# Patient Record
Sex: MALE | Race: WHITE | Employment: STUDENT | URBAN - METROPOLITAN AREA
[De-identification: names, ages, dates, MRNs, and addresses within clinical notes are randomized per-mention and may not be internally consistent; named-entity substitution may affect disease eponyms.]

---
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
## 2025-03-18 ENCOUNTER — OFFICE VISIT (OUTPATIENT)
Dept: INTERNAL MEDICINE CLINIC | Facility: CLINIC | Age: 20
End: 2025-03-18
Payer: COMMERCIAL

## 2025-03-18 VITALS
DIASTOLIC BLOOD PRESSURE: 70 MMHG | TEMPERATURE: 98 F | HEART RATE: 84 BPM | BODY MASS INDEX: 22.62 KG/M2 | OXYGEN SATURATION: 99 % | WEIGHT: 158 LBS | HEIGHT: 70 IN | SYSTOLIC BLOOD PRESSURE: 124 MMHG

## 2025-03-18 DIAGNOSIS — R53.83 OTHER FATIGUE: ICD-10-CM

## 2025-03-18 DIAGNOSIS — Z00.00 HEALTH MAINTENANCE EXAMINATION: Primary | ICD-10-CM

## 2025-03-18 DIAGNOSIS — Z91.09 ENVIRONMENTAL ALLERGIES: ICD-10-CM

## 2025-03-18 DIAGNOSIS — R00.2 PALPITATIONS: ICD-10-CM

## 2025-03-18 LAB
ATRIAL RATE: 65 BPM
P AXIS: 66 DEGREES
P-R INTERVAL: 154 MS
Q-T INTERVAL: 410 MS
QRS DURATION: 104 MS
QTC CALCULATION (BEZET): 426 MS
R AXIS: 56 DEGREES
T AXIS: 36 DEGREES
VENTRICULAR RATE: 65 BPM

## 2025-03-18 NOTE — PROGRESS NOTES
FAMILY MEDICINE CLINIC NOTE    HPI  Abner Cherry is a 19 year old male presenting for physical    #Health Maintenance  -Diet: Eating healthy - trying to stay away from junk food.   -Exercise: Not much.   -Lung cancer screen: Not indicated  -Colon cancer screen: Not indicated  -Prostate cancer screen: Not indicated  -Aortic aneurysm screen: Not indicated  -Statin:  Will check lipid panel  -ASA: Not indicated  -HIV screen: Indicated  -Hep C screen: Indicated  -Gonorrhea/chlamydia:  Not indicated  -Syphillis: Not indicated  -TB: Not indicated  -Tobacco/alcohol: Per below  -Depression: PHQ-2 score of 0 (score >/= 3 do PHQ-9)  -Advanced Directive: Indicated    #Immunizations  -Tdap: Indicated  -Flu shot: Indicated - declines  -PCV13: Not indicated   -PCV20: Not indicated   -PPSV23: Not indicated   -HPV:  Will check records  -RZV (preferred) or VZL: Not indicated   -RSV: Not indicated   -COVID: Not indicated      #Environmental allergies  -occasional allergies  -history of normal PFTs in past  -no issues with breathing at this time    #Fatigue  -reports history of fatigue 2-3 months, possibly longer  -wakes up tired  -no known history of snoring  -no depression  -no issues with sleeping    #Palpitations  -only with smoking marijuana  -denies any significant anxiety  -occurs for a couple of minutes  -experiences this every other day  -feels lightheaded during these times     #Patient Care Team  Patient Care Team:  Makayla Jain as PCP - General (Family Medicine)    ROS  GENERAL: No fever/chills, no recent weight loss   HEENT: No visual changes, no changes in hearing, no sore throats  NECK: No pain, no swelling  RESP: No cough, no SOB  CV: No chest pain, no palpitations  GI: No abd pain, no N/V/D  MSK: No edema, no pain  SKIN: No new rashes  NEURO: No numbness, no tingling, no HA    HEALTH MAINTENANCE CHECKLIST  Health Maintenance Topics with due status: Overdue       Topic Date Due    Annual Physical Never done     MMR Vaccines Never done    Varicella Vaccines Never done    HPV Vaccines Never done    Meningococcal B Vaccine Never done    DTaP,Tdap,and Td Vaccines Never done    Hepatitis B Vaccines Never done    COVID-19 Vaccine Never done    Influenza Vaccine Never done    Annual Depression Screening Never done       ALLERGIES  Allergies[1]    MEDICATIONS  No current outpatient medications on file.       ACTIVE PROBLEM  Patient Active Problem List   Diagnosis    Environmental allergies    Fatigue    Palpitations    Health maintenance examination         PAST MEDICAL HISTORY  Past Medical History:    Environmental allergies       PAST SOCIAL HISTORY  Social History     Socioeconomic History    Marital status: Single     Spouse name: Not on file    Number of children: Not on file    Years of education: Not on file    Highest education level: Not on file   Occupational History    Not on file   Tobacco Use    Smoking status: Former     Current packs/day: 0.00     Average packs/day: 0.1 packs/day for 1 year (0.1 ttl pk-yrs)     Types: Cigarettes     Start date:      Quit date:      Years since quittin.2    Smokeless tobacco: Current    Tobacco comments:     Nicotine pouches   Vaping Use    Vaping status: Former    Quit date: 2023    Substances: Nicotine, THC, Flavoring   Substance and Sexual Activity    Alcohol use: Yes     Comment: Occasional - once a month    Drug use: Yes     Types: Cannabis    Sexual activity: Not Currently     Partners: Female   Other Topics Concern    Not on file   Social History Narrative    Relationships: Single    Children: None    Pets: Dog and Cat and Fish    School: [employer] - associates in general studies. 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     Interests: Enjoys music, DJing    Spiritual: Adventist background     Social Drivers of Health     Food Insecurity: Not on file   Transportation Needs: Not on file   Stress: Not  on file   Housing Stability: At Risk (8/21/2023)    Received from [facility]     Living Situation: Not on file     Housing Problems: Not on file       PAST SURGICAL HISTORY  History reviewed. No pertinent surgical history.    PAST FAMILY HISTORY  Family History   Problem Relation Age of Onset    Other (Ankylosing spondylitis) Mother     Obesity Father     No Known Problems Brother     Heart Disease Maternal Grandmother         heart disease vs lung disease    Arthritis Maternal Grandfather     No Known Problems Paternal Grandmother     Lung Disorder Paternal Grandfather     Diabetes Maternal Great-Grandmother     Colon Cancer Neg     Prostate Cancer Neg        PHYSICAL EXAM  Vitals:    03/18/25 0946   BP: 124/70   Pulse: 84   Temp: 98.4 °F (36.9 °C)   SpO2: 99%   Weight: 158 lb (71.7 kg)   Height: 5' 10\" (1.778 m)      Body mass index is 22.67 kg/m².    GENERAL: NAD  HEENT: Moist mucous membranes, no tonsillar swelling or erythema, PERRLA bilat, TM translucent and non-bulging  NECK: Supple, non-tender  RESP: CTAB, no wheezing, no rales, no rhonchi  CV: RRR, no murmurs  GI: Soft, non-distended, non-tender, no guarding, no rebound, no masses  MSK: No edema  SKIN: Warm and dry, no rashes  NEURO: Answering questions appropriately    LABS  No results found for: \"WBC\", \"HGB\", \"HCT\", \"PLT\", \"NEPERCENT\", \"LYPERCENT\", \"MOPERCENT\", \"EOPERCENT\", \"BAPERCENT\", \"NE\", \"LYMABS\", \"MOABSO\", \"EOABSO\", \"BAABSO\", \"REITCPERCENT\"    No results found for: \"NA\", \"K\", \"CL\", \"CO2\", \"ANIONGAP\", \"BUN\", \"CREATSERUM\", \"BUNCREA\", \"GLU\", \"CA\", \"OSMOCALC\", \"GFRNAA\", \"GFRAA\", \"ALT\", \"AST\", \"ALKPHO\", \"BILT\", \"TP\", \"ALB\", \"GLOBULIN\", \"ELECTAG\", \"FASTING\"      No results found for: \"CHOLEST\", \"TRIG\", \"HDL\", \"LDL\", \"VLDL\", \"TCHDLRATIO\", \"NONHDLC\", \"CHOLHDLRATIO\", \"CALCNONHDL\"     DIAGNOSTICS    ASSESSMENT/PLAN  Problem List Items Addressed This Visit          Cardiac and Vasculature    Palpitations     Patient with infrequent  intermittent palpitations, exclusively occurring when he is smoking marijuana.  Advise discontinuation of smoking.  EKG is normal in the office  Check labs including CBC, CMP, TSH.  If with persistent palpitations, advised that he contact me and I will order a Holter monitor for him moving forward.  ER precautions discussed.  Follow-up as needed         Relevant Orders    TSH W Reflex To Free T4    EKG In-Office [[id number]] (Completed)    CBC With Differential With Platelet       Allergies and Adverse Reactions    Environmental allergies     Patient with environmental allergies.  No issues with his breathing.  Has had normal pulmonary function testing in the past.  Can use cetirizine as needed.            Symptoms and Signs    Fatigue     Patient reports mild fatigue.  Check labs including CBC, CMP, TSH.  Monitor stress levels.  Cut back on nicotine usage as well as marijuana usage.  Follow-up as needed.         Relevant Orders    TSH W Reflex To Free T4    CBC With Differential With Platelet    Comp Metabolic Panel (14)    Hemoglobin A1C       Health Encounters    Health maintenance examination - Primary     Exercise and diet advised.  CBC, CMP, lipid panel, Hba1c, TSH, HIV screen, hepatitis C screen  Tdap today.  Flu shot declined  Advised to send over pediatric vaccine records, especially HPV  COVID vaccine advised.  Advanced directive information provided.         Relevant Orders    TETANUS, DIPHTHERIA TOXOIDS AND ACELLULAR PERTUSIS VACCINE (TDAP), >7 YEARS, IM USE (Completed)    TSH W Reflex To Free T4    CBC With Differential With Platelet    Comp Metabolic Panel (14)    HCV Antibody    Hemoglobin A1C    Lipid Panel    HIV Ag/Ab Combo    Tobacco Use Counseling 3-10 Min [14428]   Tobacco cessation counseling for <3 minutes.    Return in about 1 year (around 3/18/2026) for physical.    Julio Hairston MD  Family Medicine         [1]   Allergies  Allergen Reactions    Omnicef [Cefdinir] RASH

## 2025-03-18 NOTE — ASSESSMENT & PLAN NOTE
Patient with infrequent intermittent palpitations, exclusively occurring when he is smoking marijuana.  Advise discontinuation of smoking.  EKG is normal in the office  Check labs including CBC, CMP, TSH.  If with persistent palpitations, advised that he contact me and I will order a Holter monitor for him moving forward.  ER precautions discussed.  Follow-up as needed

## 2025-03-18 NOTE — ASSESSMENT & PLAN NOTE
Patient with environmental allergies.  No issues with his breathing.  Has had normal pulmonary function testing in the past.  Can use cetirizine as needed.

## 2025-03-18 NOTE — PATIENT INSTRUCTIONS
PATIENT INSTRUCTIONS    Thank you for seeing me today, it was a pleasure taking care of you.  Please check out at the  and schedule a follow up appointment.  Return in about 1 year (around 3/18/2026) for physical.  Please remember that the preferred deborah period for appointments is 5 minutes. This is to help maximize the amount of time that we can spend together at our visits.    Please get your labs drawn at your preferred lab.  The following imaging studies were ordered: None  Please also follow up with the following specialists: None  Please fill out the advance directive information (power of  documents) and bring a copy to our clinic.  Healthy diet and exercise  Cut back on nicotine, alcohol, marijuana  If you continue to have palpitations, please call me and I will order a holter monitor for you to get   Monitor stress and anxiety   Please send me your pediatric vaccine history  In particular - would like to know about HPV vaccine series    Best,   Dr. Hairston    [facility] LABS AND IMAGING INFORMATION    Here are some lab and imaging locations available to you. Please note that some of the times and availabilities are subject to change. Please refer to the [facility] webpage for the most recent updates. There are also additional locations that can be found on the [facility] webpage.    To schedule a lab appointment, please call [phone].    To schedule an imaging appointment, please call [phone], option 2      [address] [address]    Lab Hours  Monday - Friday 7:30am - 5pm  Saturday 6:30am - 3pm    X-ray Hours  Call [phone] for hours or to schedule      [address] [address]    Lab Hours  Monday - Friday 6:30am - 8pm  Saturday 6:30am - 3pm  Sunday 6:30am - 3pm    X-ray Hours  Call [phone] for hours or to schedule      [facility]  [facility]  [address] [place] [address]    Lab Hours  Monday - Friday 7:30am - 5pm  Saturday 7:30am - 11:30am    X-ray Hours  None at this location      [facility] [place]  [address] [place] [address]    Lab Hours  Monday - Friday 8am - 12pm    X-ray Hours  Call [phone] for hours or to schedule      [facility] [place]  [address] [place] [address]    Lab Hours  Monday - Friday 7:30am - 5pm  Saturday 6:30am - 3pm    X-ray Hours  Call [phone] for hours or to schedule      [facility] [place]  [address]  [place] [address]    Lab Hours  Monday - Friday 7:30am - 4pm (closed 12:15pm - 1pm)    X-ray Hours  Call [phone] for hours or to schedule      [facility] [place]  [address]  [address]  [place] [address]    Lab Hours  Monday - Friday 8am - 4:30pm (closed 12:15pm - 1pm)    X-ray Hours  None at this location        Quitting Smoking    Quitting smoking is the most important step you can take to improve your health. We're glad you have set a goal to improve your health.    Quit Smoking Resources    In addition to medications, use the STAR plan to help you successfully quit.   Stick with your quit date!   Tell friends, family, and coworkers your quit date. Request their understanding and support.  Anticipate and prepare for challenges. Some examples are withdrawal symptoms, being around others who smoke, and drinking alcohol.  Remove all tobacco products and paraphernalia from your environment. Make your home and vehicles smoke-free.    Free resources for additional support:  National tobacco quitline: 1-800-QUIT-NOW (1-265.676.7973).  SmokefreeTXT is a free text program to assist you in quitting. Visit https://www.smokefree.gov/smokefreetxt for more information.  Feel free to  call your care manager at [phone]) for additional support.

## 2025-03-18 NOTE — ASSESSMENT & PLAN NOTE
Exercise and diet advised.  CBC, CMP, lipid panel, Hba1c, TSH, HIV screen, hepatitis C screen  Tdap today.  Flu shot declined  Advised to send over pediatric vaccine records, especially HPV  COVID vaccine advised.  Advanced directive information provided.

## 2025-03-18 NOTE — ASSESSMENT & PLAN NOTE
Patient reports mild fatigue.  Check labs including CBC, CMP, TSH.  Monitor stress levels.  Cut back on nicotine usage as well as marijuana usage.  Follow-up as needed.

## 2025-03-20 ENCOUNTER — LAB ENCOUNTER (OUTPATIENT)
Dept: LAB | Age: 20
End: 2025-03-20
Attending: FAMILY MEDICINE
Payer: COMMERCIAL

## 2025-03-20 DIAGNOSIS — R53.83 OTHER FATIGUE: ICD-10-CM

## 2025-03-20 DIAGNOSIS — Z00.00 HEALTH MAINTENANCE EXAMINATION: ICD-10-CM

## 2025-03-20 DIAGNOSIS — R00.2 PALPITATIONS: ICD-10-CM

## 2025-03-20 LAB
ALBUMIN SERPL-MCNC: 5 G/DL (ref 3.2–4.8)
ALBUMIN/GLOB SERPL: 2.2 {RATIO} (ref 1–2)
ALP LIVER SERPL-CCNC: 75 U/L
ALT SERPL-CCNC: 12 U/L
ANION GAP SERPL CALC-SCNC: 5 MMOL/L (ref 0–18)
AST SERPL-CCNC: 17 U/L (ref ?–34)
BASOPHILS # BLD AUTO: 0.03 X10(3) UL (ref 0–0.2)
BASOPHILS NFR BLD AUTO: 0.9 %
BILIRUB SERPL-MCNC: 0.8 MG/DL (ref 0.3–1.2)
BUN BLD-MCNC: 15 MG/DL (ref 9–23)
BUN/CREAT SERPL: 14.4 (ref 10–20)
CALCIUM BLD-MCNC: 9.5 MG/DL (ref 8.7–10.4)
CHLORIDE SERPL-SCNC: 106 MMOL/L (ref 98–112)
CHOLEST SERPL-MCNC: 166 MG/DL (ref ?–200)
CO2 SERPL-SCNC: 29 MMOL/L (ref 21–32)
CREAT BLD-MCNC: 1.04 MG/DL
DEPRECATED RDW RBC AUTO: 40.6 FL (ref 35.1–46.3)
EGFRCR SERPLBLD CKD-EPI 2021: 105 ML/MIN/1.73M2 (ref 60–?)
EOSINOPHIL # BLD AUTO: 0.19 X10(3) UL (ref 0–0.7)
EOSINOPHIL NFR BLD AUTO: 5.7 %
ERYTHROCYTE [DISTWIDTH] IN BLOOD BY AUTOMATED COUNT: 12 % (ref 11–15)
EST. AVERAGE GLUCOSE BLD GHB EST-MCNC: 100 MG/DL (ref 68–126)
FASTING PATIENT LIPID ANSWER: YES
FASTING STATUS PATIENT QL REPORTED: YES
GLOBULIN PLAS-MCNC: 2.3 G/DL (ref 2–3.5)
GLUCOSE BLD-MCNC: 87 MG/DL (ref 70–99)
HBA1C MFR BLD: 5.1 % (ref ?–5.7)
HCT VFR BLD AUTO: 42.9 %
HCV AB SERPL QL IA: NONREACTIVE
HDLC SERPL-MCNC: 58 MG/DL (ref 40–59)
HGB BLD-MCNC: 14.5 G/DL
IMM GRANULOCYTES # BLD AUTO: 0 X10(3) UL (ref 0–1)
IMM GRANULOCYTES NFR BLD: 0 %
LDLC SERPL CALC-MCNC: 100 MG/DL (ref ?–100)
LYMPHOCYTES # BLD AUTO: 1.24 X10(3) UL (ref 1–4)
LYMPHOCYTES NFR BLD AUTO: 37 %
MCH RBC QN AUTO: 30.9 PG (ref 26–34)
MCHC RBC AUTO-ENTMCNC: 33.8 G/DL (ref 31–37)
MCV RBC AUTO: 91.3 FL
MONOCYTES # BLD AUTO: 0.38 X10(3) UL (ref 0.1–1)
MONOCYTES NFR BLD AUTO: 11.3 %
NEUTROPHILS # BLD AUTO: 1.51 X10 (3) UL (ref 1.5–7.7)
NEUTROPHILS # BLD AUTO: 1.51 X10(3) UL (ref 1.5–7.7)
NEUTROPHILS NFR BLD AUTO: 45.1 %
NONHDLC SERPL-MCNC: 108 MG/DL (ref ?–130)
OSMOLALITY SERPL CALC.SUM OF ELEC: 290 MOSM/KG (ref 275–295)
PLATELET # BLD AUTO: 211 10(3)UL (ref 150–450)
POTASSIUM SERPL-SCNC: 4.7 MMOL/L (ref 3.5–5.1)
PROT SERPL-MCNC: 7.3 G/DL (ref 5.7–8.2)
RBC # BLD AUTO: 4.7 X10(6)UL
SODIUM SERPL-SCNC: 140 MMOL/L (ref 136–145)
TRIGL SERPL-MCNC: 35 MG/DL (ref 30–149)
TSI SER-ACNC: 1.94 UIU/ML (ref 0.55–4.78)
VLDLC SERPL CALC-MCNC: 6 MG/DL (ref 0–30)
WBC # BLD AUTO: 3.4 X10(3) UL (ref 4–11)

## 2025-03-20 PROCEDURE — 80053 COMPREHEN METABOLIC PANEL: CPT

## 2025-03-20 PROCEDURE — 80061 LIPID PANEL: CPT

## 2025-03-20 PROCEDURE — 84443 ASSAY THYROID STIM HORMONE: CPT

## 2025-03-20 PROCEDURE — 36415 COLL VENOUS BLD VENIPUNCTURE: CPT

## 2025-03-20 PROCEDURE — 85025 COMPLETE CBC W/AUTO DIFF WBC: CPT

## 2025-03-20 PROCEDURE — 86803 HEPATITIS C AB TEST: CPT

## 2025-03-20 PROCEDURE — 87389 HIV-1 AG W/HIV-1&-2 AB AG IA: CPT

## 2025-03-20 PROCEDURE — 83036 HEMOGLOBIN GLYCOSYLATED A1C: CPT

## 2025-04-03 ENCOUNTER — PATIENT MESSAGE (OUTPATIENT)
Dept: INTERNAL MEDICINE CLINIC | Facility: CLINIC | Age: 20
End: 2025-04-03

## 2025-04-03 ENCOUNTER — TELEPHONE (OUTPATIENT)
Dept: INTERNAL MEDICINE CLINIC | Facility: CLINIC | Age: 20
End: 2025-04-03

## 2025-04-03 NOTE — TELEPHONE ENCOUNTER
Patient is requesting a referral to a Dermatologist;  patient is aware that Dr. Hairston will be out of the office until 04/16/2025.    Patient was last seen on 03/18/2025 for a new patient exam.    Please call patient when referral is in the system and available to schedule:    [phone]     KG

## 2025-04-04 ENCOUNTER — NURSE TRIAGE (OUTPATIENT)
Dept: INTERNAL MEDICINE CLINIC | Facility: CLINIC | Age: 20
End: 2025-04-04

## 2025-04-04 NOTE — TELEPHONE ENCOUNTER
Spoke with Miki who states he had a mole on top of his head and seen a Dermatologist 3 years ago. Patient denied change in the size, color, pain, itching or bleeding.    Patient is requesting Dermatology referral.       Disposition: Seen in 2 weeks    RN informed Miki if there are any changes with mole color,shape, size, develop pain, or bleeding call the office. Patient voiced understanding.    Does Miki need to schedule an appointment with Dr. Hairston for evaluation prior to Dermatology referral?    Please advise.      Reason for Disposition  • Caller is uncertain what lesion is    Answer Assessment - Initial Assessment Questions  1. APPEARANCE of LESION: \"What does it look like?\"       Small non-raised mole.   2. SIZE: \"How big is it?\" (e.g., inches, cm; or compare to size of pinhead, tip of pen, eraser, coin, pea, grape, ping pong ball)       Unknown since on top of patient's head.  3. COLOR: \"What color is it?\" \"Is there more than one color?\"      Brown  4. SHAPE: \"What shape is it?\" (e.g., round, irregular)      Round  5. RAISED: \"Does it stick up above the skin or is it flat?\" (e.g., raised or elevated)      Denied  6. TENDER: \"Does it hurt when you touch it?\"  (Scale 1-10; or mild, moderate, severe)      0/10  7. LOCATION: \"Where is it located?\"       Top of head.   8. ONSET: \"When did it first appear?\"       3 years  9. NUMBER: \"Is there just one?\" or \"Are there others?\"      1  10. CAUSE: \"What do you think it is?\"        Patient has mole for last 3 years without changes.   11. OTHER SYMPTOMS: \"Do you have any other symptoms?\" (e.g., fever)        Denied  12. PREGNANCY: \"Is there any chance you are pregnant?\" \"When was your last menstrual period?\"        N/A    Protocols used: Skin Lesion - Moles or Growths-A-OH

## 2025-04-04 NOTE — TELEPHONE ENCOUNTER
I don't know what is Dr. Hairston's preference since  he was just seen recently. Thus I will defer to Dr. Hairston to determine upon his return. Sounds like this is not urgent though as he has had this mole for a long time ant its not changing in any way per your note.

## 2025-04-10 ENCOUNTER — HOSPITAL ENCOUNTER (OUTPATIENT)
Age: 20
Discharge: HOME | End: 2025-04-10
Payer: COMMERCIAL

## 2025-04-10 VITALS
OXYGEN SATURATION: 100 % | TEMPERATURE: 98 F | SYSTOLIC BLOOD PRESSURE: 121 MMHG | DIASTOLIC BLOOD PRESSURE: 69 MMHG | RESPIRATION RATE: 18 BRPM | HEART RATE: 71 BPM

## 2025-04-10 DIAGNOSIS — J10.1 INFLUENZA B: ICD-10-CM

## 2025-04-10 DIAGNOSIS — R05.9 COUGH: Primary | ICD-10-CM

## 2025-04-10 LAB
POCT INFLUENZA A: NEGATIVE
POCT INFLUENZA B: POSITIVE
SARS-COV-2 RNA RESP QL NAA+PROBE: NOT DETECTED

## 2025-04-10 PROCEDURE — U0002 COVID-19 LAB TEST NON-CDC: HCPCS | Performed by: PHYSICIAN ASSISTANT

## 2025-04-10 PROCEDURE — 99203 OFFICE O/P NEW LOW 30 MIN: CPT | Performed by: NURSE PRACTITIONER

## 2025-04-10 PROCEDURE — 87502 INFLUENZA DNA AMP PROBE: CPT | Performed by: PHYSICIAN ASSISTANT

## 2025-04-10 RX ORDER — BENZONATATE 100 MG/1
100 CAPSULE ORAL 3 TIMES DAILY PRN
Qty: 10 CAPSULE | Refills: 0 | Status: SHIPPED | OUTPATIENT
Start: 2025-04-10

## 2025-04-10 RX ORDER — OSELTAMIVIR PHOSPHATE 75 MG/1
75 CAPSULE ORAL 2 TIMES DAILY
Qty: 10 CAPSULE | Refills: 0 | Status: SHIPPED | OUTPATIENT
Start: 2025-04-10 | End: 2025-04-15

## 2025-04-10 NOTE — ED PROVIDER NOTES
Patient Seen in: [facility]      History     Chief Complaint   Patient presents with    Fever     Fever for 3rd day. Feeling weak overall - Entered by patient     Stated Complaint: Fever - Fever for 3rd day. Feeling weak overall  Subjective:   19-year-old male with no past medical history presents from home.  Patient is here with complaint of fever and cough for the last 3 days.  Tmax 103.  Having some stuffy nose and coughing up some phlegm.  Associated body aches.  States he feels fatigued.  No shortness of breath or wheezing.  Taking DayQuil and NyQuil at home for symptoms.  No COVID testing done at home.  No history of pneumonia.  He is a social smoker.    The history is provided by the patient. No  was used.     Objective:   Past Medical History:    Environmental allergies            History reviewed. No pertinent surgical history.           Social History     Socioeconomic History    Marital status: Single   Tobacco Use    Smoking status: Former     Current packs/day: 0.00     Average packs/day: 0.1 packs/day for 1 year (0.1 ttl pk-yrs)     Types: Cigarettes     Start date:      Quit date:      Years since quittin.2    Smokeless tobacco: Current    Tobacco comments:     Nicotine pouches   Vaping Use    Vaping status: Former    Quit date: 2023    Substances: Nicotine, THC, Flavoring   Substance and Sexual Activity    Alcohol use: Yes     Comment: Occasional - once a month    Drug use: Yes     Types: Cannabis    Sexual activity: Not Currently     Partners: Female   Social History Narrative    Relationships: Single    Children: None    Pets: Dog and Cat and Fish    School: [employer] - associates in general studies. 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     Interests: Enjoys music, DJing    Spiritual: Orthodox background     Social Drivers of Health      Received from [employer]    [facility]             Review of Systems    Positive for stated complaint: Fever (Fever for 3rd day. Feeling weak overall - Entered by patient)    Other systems are as noted in HPI.  Constitutional and vital signs reviewed.      All other systems reviewed and negative except as noted above.    Physical Exam     ED Triage Vitals [04/10/25 1025]   /69   Pulse 71   Resp 18   Temp 98.2 °F (36.8 °C)   Temp src Oral   SpO2 100 %   O2 Device None (Room air)     Current:/69   Pulse 71   Temp 98.2 °F (36.8 °C) (Oral)   Resp 18   SpO2 100%     Physical Exam  Vitals and nursing note reviewed.   Constitutional:       General: He is not in acute distress.     Appearance: Normal appearance. He is not ill-appearing or toxic-appearing.   HENT:      Head: Normocephalic and atraumatic.      Right Ear: Tympanic membrane, ear canal and external ear normal.      Left Ear: Tympanic membrane, ear canal and external ear normal.      Nose: Nose normal.      Mouth/Throat:      Mouth: Mucous membranes are moist.      Pharynx: Oropharynx is clear.   Eyes:      Pupils: Pupils are equal, round, and reactive to light.   Cardiovascular:      Rate and Rhythm: Normal rate and regular rhythm.      Pulses: Normal pulses.   Pulmonary:      Effort: Pulmonary effort is normal. No respiratory distress.      Breath sounds: Normal breath sounds.      Comments: Lungs clear.  No adventitious lung sounds.  No distress.  No hypoxia.  Pulse ox 100% ra. Which is normal    Abdominal:      General: Abdomen is flat.      Palpations: Abdomen is soft.   Musculoskeletal:         General: No signs of injury. Normal range of motion.      Cervical back: Normal range of motion and neck supple.   Lymphadenopathy:      Cervical: No cervical adenopathy.   Skin:     General: Skin is warm and dry.      Capillary Refill: Capillary refill takes less than 2 seconds.   Neurological:      General: No focal deficit present.      Mental Status: He is alert and oriented to person, place,  and time.      GCS: GCS eye subscore is 4. GCS verbal subscore is 5. GCS motor subscore is 6.   Psychiatric:         Mood and Affect: Mood normal.         Behavior: Behavior normal.         Thought Content: Thought content normal.         Judgment: Judgment normal.         ED Course   Radiology:  No results found.  Labs Reviewed   POCT FLU TEST - Abnormal; Notable for the following components:       Result Value    POCT INFLUENZA B Positive (*)     All other components within normal limits    Narrative:     This assay is a rapid molecular in vitro test utilizing nucleic acid amplification of influenza A and B viral RNA.   RAPID SARS-COV-2 BY PCR - Normal     Recent Results (from the past 24 hours)   POCT Flu Test    Collection Time: 04/10/25 10:29 AM    Specimen: Nares; Other   Result Value Ref Range    POCT INFLUENZA A Negative Negative    POCT INFLUENZA B Positive (A) Negative   Rapid SARS-CoV-2 by PCR    Collection Time: 04/10/25 10:29 AM    Specimen: Nares; Other   Result Value Ref Range    Rapid SARS-CoV-2 by PCR Not Detected Not Detected       MDM     Medical Decision Making  Differential diagnoses reflecting the complexity of care include: COVID, flu, pneumonia, viral URI  Influenza B is positive.  A is negative  COVID is negative  No evidence of pneumonia  Patient well-appearing on exam.  Afebrile here  Discussed viral origin of the flu.  We did discuss the option of Tamiflu, side effects discussed    Plan of Care: Rx Tamiflu and Tessalon.  May continue NyQuil and DayQuil.  Stressed the need to treat fever with Tylenol or Motrin.  Push fluids.  Expect to be ill for several more days.  Follow-up with primary doctor if no improvement    Results and plan of care discussed with the patient/family. They are in agreement with discharge. They understand to follow up with their primary doctor or the referral physician for further evaluation, especially if no improvement.  Also discussed the limitations of immediate  care, patient is aware that if symptoms are worse they should go to the emergency room. Verbal and written discharge instructions were given.     My independent interpretation of studies of: Influenza B positive  Diagnostic tests and medications considered but not ordered were: No indication for antibiotics  Shared decision making was done by: Discussed the option of Tamiflu  Comorbidities that add complexity to management include: None  External chart review was done and was noted: Reviewed, diagnosis of asthma listed  History obtained by an independent source was from: N/A  Discussions and management was done with: N/A  Social determinants of health that affect care: N/A              Problems Addressed:  Influenza B: acute illness or injury    Amount and/or Complexity of Data Reviewed  Labs: ordered. Decision-making details documented in ED Course.    Risk  OTC drugs.  Prescription drug management.        Disposition and Plan     Clinical Impression:  1. Cough    2. Influenza B         Disposition:  Discharge  4/10/2025 10:53 am    Follow-up:  Julio Hairston MD  [address] [place] [id number]  [phone]                Medications Prescribed:  Discharge Medication List as of 4/10/2025 10:55 AM        START taking these medications    Details   oseltamivir (TAMIFLU) 75 MG Oral Cap Take 1 capsule (75 mg total) by mouth 2 (two) times daily for 5 days., Normal, Disp-10 capsule, R-0      benzonatate 100 MG Oral Cap Take 1 capsule (100 mg total) by mouth 3 (three) times daily as needed for cough., Normal, Disp-10 capsule, R-0

## 2025-04-10 NOTE — DISCHARGE INSTRUCTIONS
The flu test is positive.  Flu is a virus.  You do have the option of taking Tamiflu which may lessen the length and severity of symptoms.  You may continue to feel ill for a few more days.  Use over-the-counter remedies for symptom control.  You may use Tessalon as needed for the cough.  Take Tylenol or Motrin as needed for fever or pain.  Drink plenty of fluids.  Recheck with your primary doctor if no improvement

## 2025-05-12 ENCOUNTER — TELEMEDICINE (OUTPATIENT)
Dept: TELEHEALTH | Age: 20
End: 2025-05-12
Payer: COMMERCIAL

## 2025-05-12 DIAGNOSIS — J30.2 SEASONAL ALLERGIES: Primary | ICD-10-CM

## 2025-05-12 RX ORDER — FLUTICASONE PROPIONATE 50 MCG
2 SPRAY, SUSPENSION (ML) NASAL DAILY
Qty: 1 EACH | Refills: 0 | Status: SHIPPED | OUTPATIENT
Start: 2025-05-12

## 2025-05-12 RX ORDER — LEVOCETIRIZINE DIHYDROCHLORIDE 5 MG/1
5 TABLET, FILM COATED ORAL EVERY EVENING
Qty: 30 TABLET | Refills: 0 | Status: SHIPPED | OUTPATIENT
Start: 2025-05-12

## 2025-05-12 RX ORDER — OLOPATADINE HYDROCHLORIDE 2 MG/ML
1 SOLUTION/ DROPS OPHTHALMIC DAILY
Qty: 5 ML | Refills: 0 | Status: SHIPPED | OUTPATIENT
Start: 2025-05-12

## 2025-05-12 NOTE — PROGRESS NOTES
Virtual/Telephone Check-In    Abner Cherry verbally consents to a Virtual/Telephone Check-In service on 05/12/25.  Patient has been referred to the [facility] website at [email or web address] to review the yearly Consent to Treat document.  Patient understands and accepts financial responsibility for any deductible, co-insurance and/or co-pays associated with this service.     Telehealth Verbal Consent   I conducted a telehealth visit with Abner Cherry today, 05/12/25, which was completed using two-way, real-time interactive audio and video communication. This has been done in good lucas to provide continuity of care in the best interest of the provider-patient relationship, due to the COVID -19 public health crisis/national emergency where restrictions of face-to-face office visits are ongoing. Every conscious effort was taken to allow for sufficient and adequate time to complete the visit.  The patient was made aware of the limitations of the telehealth visit, including treatment limitations as no physical exam could be performed.  The patient was advised to call 911 or to go to the ER in case there was an emergency.  The patient was also advised of the potential privacy & security concerns related to the telehealth platform.   The patient was made aware of where to find [facility]'s notice of privacy practices, telehealth consent form and other related consent forms and documents.  which are located on the [facility] website. The patient verbally agreed to telehealth consent form, related consents and the risks discussed.    Lastly, the patient confirmed that they were in [place].   Included in this visit, time may have been spent reviewing labs, medications, radiology tests and decision making. Appropriate medical decision-making and tests are ordered as detailed in the plan of care above.  Coding/billing information is submitted for this visit based on complexity of care and/or time spent for the  visit.    CHIEF COMPLAINT:     Chief Complaint   Patient presents with    Allergies       HPI:   Abner Cherry is a 19 year old male who presents for seasonal allergy symptoms.  Allergies started to flare about 1 month ago and worsened about 2 weeks ago. Patient  admits to a history of seasonal allergies.  Patient denies rash, tongue swelling, lip swelling, facial swelling, difficulty breathing, rapid breathing, difficulty swallowing, confusion, blue skin, dizziness, lightheadedness, nausea, vomiting, diarrhea, fevers, chills, sweats, and body aches.      Runny nose : Yes [x]     No []      Nasal congestion: Yes [x]     No []       Sinus pressure: Yes [x]     No []  Currently no sinus pressure.    Sore throat:   Yes []      No [x]      Ear Pain:  Yes []      No [x]     Cough:    Yes []     No [x]      Shortness of breath:  Yes []   No [x]     Wheezing:   Yes []   No [x]     Chest pain/pressure:     Yes []     No [x]      Watery eyes- Yes.  Denies eye itching.          Patient has tried Zyrtec for symptoms.  Takes in the morning. Zyrtec helps the first half of the day.        Current Medications[1]   Past Medical History[2]   Past Surgical History[3]      Short Social Hx on File[4]      REVIEW OF SYSTEMS:   GENERAL: Normal appetite  SKIN: no rashes or abnormal skin lesions  HEENT: See HPI  LUNGS: Denies shortness of breath and difficulty breathing.  CARDIOVASCULAR: denies chest pain and palpitations   GI: See HPI  NEURO:  No headache    EXAM:   General: Alert, Well-appearing, and In no acute distress  Respiratory:   Speaking in full sentences comfortably  Normal work of breathing  No cough during visit  Head: Normocephalic  Nose: No obvious nasal discharge.  Skin: No obvious rashes or lesions from what observed.     No results found for this or any previous visit (from the past 24 hours).    ASSESSMENT AND PLAN:   Abner Cherry is a 19 year old male who presents with symptoms that are consistent  with    ASSESSMENT:   Encounter Diagnosis   Name Primary?    Seasonal allergies Yes       PLAN: Meds as below.  See patient Instructions.        Meds & Refills for this Visit:  Requested Prescriptions     Signed Prescriptions Disp Refills    levocetirizine 5 MG Oral Tab 30 tablet 0     Sig: Take 1 tablet (5 mg total) by mouth every evening.    fluticasone propionate (FLONASE ALLERGY RELIEF) 50 MCG/ACT Nasal Suspension 1 each 0     Si sprays by Each Nare route daily.    Olopatadine HCl (PATADAY) 0.2 % Ophthalmic Solution 5 mL 0     Sig: Apply 1 drop to eye daily.       Risks, benefits, and side effects of medication explained and discussed.    Patient Instructions       Try the following for the next 7 days (in order of effectiveness)    1. Start Xyzal and discontinue Zyrtec. Start with once a day, but you can taken this twice a day safely.    2. Flonase or Nasacort twice a day: nasal steroids (over the counter) that can be administered to decrease allergy response. These medications take about 3-4 days to reach peak affect. Make sure to point the spray away from your septum (the middle part of your nose) to reduce the risk of nose bleeds.    3. Neilmed Sinus Rinse or Neti Pot 2-3 times per day - to flush your nose and help eliminate what is causing your allergy response. Make sure to use bottled water.      4. Sudafed from pharmacist (not a prescription) -May use as long as no history of heart issues or high blood pressure.    5. Other measures such as the closing the windows in your home, vacuuming/dusting regularly and showering before bed during outdoor allergy season may also help reduce your exposure to allergens.      Without a fever or symptoms that are not getting worse, you may be better in the next 5-7 days.      If you start developing a fever greater then 100.4, symptoms greater then 14 days, or new and concerning symptoms, you should be seen in person.  Please make a follow up appointment with your  primary care provider.  If at any point you believe you are experiencing a medical emergency the ER is advised.       Causes of Nasal Allergies  Nasal allergies are most often caused by 1 or more of these 4 kinds of allergens:   Pollen (which causes seasonal allergies)  Dust mites  Mold  Animals (including pests such as rodents)  Other substances, called irritants, can bother the nose and make allergy symptoms worse.     Pollen  Pollen comes from plants, including trees, grass, and weeds. Plants reproduce by moving tiny grains of pollen from plant to plant. Some pollen is carried by bees. And some is blown by the wind. It’s the wind-blown pollen that causes nasal allergies. The amount of pollen in the air changes from season to season.   Dust mites   Dust mites are tiny bugs too small to see. They can live in mattresses, blankets, stuffed toys, and carpets. Their droppings are a common indoor cause of nasal allergies. Dust mites are normal. They're not an infestation and they don't bite.   Mold  Mold loves dark, damp areas, both indoors and outdoors. It tends to grow in bathrooms, basements, refrigerators, and in the soil of houseplants. Mold reproduces by sending tiny grains called spores into the air. If these spores are breathed in, they can cause a nasal allergic reaction. Mold is also present in decaying vegetable matter. For instance, in piles of wet leaves in a yard.   Animals  Pets are common causes of nasal allergies. This includes cats, dogs, birds, horses, and rabbits. Flakes of skin (dander), saliva left on fur when an animal cleans itself, urine in litter boxes and cages, and feathers can all cause nasal allergies. Pests, such as rodents, can also be a major cause of allergies. This is especially the case in urban settings. They may not even be in your own apartment.    Irritants  Irritants don’t cause nasal allergies. But they can make allergy symptoms worse. Examples of irritants include:   Cigarette  smoke  Aerosol from e-cigarettes and other vaping devices  Perfume  Aerosol sprays  Smoke from wood stoves or fireplaces  Air pollution  Car exhaust  Strong odors such as from cleaning products    [employer] last reviewed this educational content on 3/1/2024  This information is for informational purposes only. This is not intended to be a substitute for professional medical advice, diagnosis, or treatment. Always seek the advice and follow the directions from your physician or other qualified health care provider.  © 4700-2915 [employer]. All rights reserved. This information is not intended as a substitute for professional medical care. Always follow your healthcare professional's instructions.        Allergic Rhinitis  Allergic rhinitis is an allergic reaction that affects the nose, and often the eyes. It’s often known as nasal allergies. Nasal allergies are often due to things in the environment that are breathed in. Depending on what you're sensitive to, nasal allergies may occur only during certain seasons, or they may occur year round. Common indoor allergens include house dust mites, mold, cockroaches, and pet dander. Outdoor allergens include pollen from trees, grasses, and weeds.    Symptoms include a runny, stuffy, or itchy nose. They also include sneezing and red and itchy eyes. You may feel tired more often. Severe allergies may also affect your breathing and trigger a condition called asthma.    Tests can be done to see what allergens are affecting you. You may be referred to an allergy specialist for testing and further evaluation.   Home care  Your healthcare provider may prescribe medicines to help relieve allergy symptoms. These may include oral medicines, nasal sprays, or eye drops. Take these as directed by your provider. Don't share these prescription medicines with others.   Ask your provider for advice on how to stay away from substances that you're allergic to. Below are a few  tips for each type of allergen.   Pet dander:  Don't have pets with fur and feathers.  If you have a pet, keep it out of your bedroom and off upholstered furniture.  Don't pet, hug, or kiss the pet. If you do touch the pet, wash your hands afterwards.  Pollen:  When pollen counts are high, keep windows of your car and home closed. If possible, use an air conditioner instead.  Wear a filter mask when mowing or doing yard work. Shower, wash your hair, and change clothes after being outside.  House dust mites:  Wash bedding every week in hot water and detergent and dry on a hot setting.  Cover the mattress, box spring, and pillows with allergy covers.   If possible, sleep in a room with no carpet, curtains, or upholstered furniture.  Cockroaches:  Store food in sealed containers.  Keep cabinets and floors clean and free of food crumbs.  Remove garbage from the home promptly.  Fix water leaks.  Block all areas where cockroaches can enter the home. Or have a professional  remove the cockroaches.  Mold:  Keep humidity low by using a dehumidifier or air conditioner. Keep the dehumidifier and air conditioner clean and free of mold.  Clean moldy areas with bleach and water. Don't mix bleach with other .  In general:  Vacuum once or twice a week. If possible, use a vacuum with a high-efficiency particulate air (HEPA) filter.  Don't smoke. Stay away from cigarette smoke. And don't let people smoke in your home or car. Cigarette smoke is an irritant that can make symptoms worse.  Follow-up care  Follow up as advised by the healthcare provider or our staff. If you were referred to an allergy specialist, make this appointment promptly. Ask your provider if allergy testing or allergy immunotherapy (such as allergy shots) are right for you.   When to call your healthcare provider   Call your healthcare provider right away if these occur:   Coughing  Fever of 100.4°F (38°C) or higher, or as advised by your  provider  Raised red bumps (hives)  Symptoms that are new, continue, or get worse  Call 911  Call 911 right away if you have:   Trouble breathing  Severe swelling of the face or severe itching of the eyes or mouth  Wheezing or shortness of breath  Chest tightness  Dizziness or lightheadedness  Feeling of doom  Stomach pain, bloating, vomiting, or diarrhea  [employer] last reviewed this educational content on 6/1/2022  This information is for informational purposes only. This is not intended to be a substitute for professional medical advice, diagnosis, or treatment. Always seek the advice and follow the directions from your physician or other qualified health care provider.  © 5798-9202 [employer]. All rights reserved. This information is not intended as a substitute for professional medical care. Always follow your healthcare professional's instructions.        [employer]™  Step-by-Step: Using Nasal Spray  This video shows how to use a nasal spray.     To watch the video:  Scan the QR code  Using your mobile device, scan the following code:    OR  Go to the website:  www.[employer]ufind  Enter the prescription code:   O9W   This information is for informational purposes only. This is not intended to be a substitute for professional medical advice, diagnosis, or treatment. Always seek the advice and follow the directions from your physician or other qualified health care provider.  © 6420-4282 [employer]. All rights reserved. This information is not intended as a substitute for professional medical care. Always follow your healthcare professional's instructions.               The patient indicates understanding of these issues and agrees to the plan.  The patient is asked to return if sx's persist or worsen.        Abner Cherry understands video visit evaluation is not a substitute for face-to-face examination or emergency care. Patient advised to go to ER or call 911 for  worsening symptoms or acute distress.               [1]   Current Outpatient Medications   Medication Sig Dispense Refill    levocetirizine 5 MG Oral Tab Take 1 tablet (5 mg total) by mouth every evening. 30 tablet 0    fluticasone propionate (FLONASE ALLERGY RELIEF) 50 MCG/ACT Nasal Suspension 2 sprays by Each Nare route daily. 1 each 0    Olopatadine HCl (PATADAY) 0.2 % Ophthalmic Solution Apply 1 drop to eye daily. 5 mL 0   [2]   Past Medical History:   Environmental allergies   [3] History reviewed. No pertinent surgical history.  [4]   Social History  Socioeconomic History    Marital status: Single   Tobacco Use    Smoking status: Former     Current packs/day: 0.00     Average packs/day: 0.1 packs/day for 1 year (0.1 ttl pk-yrs)     Types: Cigarettes     Start date:      Quit date:      Years since quittin.3    Smokeless tobacco: Current    Tobacco comments:     Nicotine pouches   Vaping Use    Vaping status: Former    Quit date: 2023    Substances: Nicotine, THC, Flavoring   Substance and Sexual Activity    Alcohol use: Yes     Comment: Occasional - once a month    Drug use: Yes     Types: Cannabis    Sexual activity: Not Currently     Partners: Female   Social History Narrative    Relationships: Single    Children: None    Pets: Dog and Cat and Fish    School: [employer] - [employer] in [employer] studies. 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     Interests: Enjoys music, DJing    Spiritual: Cong[employer] background     Social Drivers of Health      Received from [facility]

## 2025-05-12 NOTE — PATIENT INSTRUCTIONS
Try the following for the next 7 days (in order of effectiveness)    1. Start Xyzal and discontinue Zyrtec. Start with once a day, but you can taken this twice a day safely.    2. Flonase or Nasacort twice a day: nasal steroids (over the counter) that can be administered to decrease allergy response. These medications take about 3-4 days to reach peak affect. Make sure to point the spray away from your septum (the middle part of your nose) to reduce the risk of nose bleeds.    3. Neilmed Sinus Rinse or Neti Pot 2-3 times per day - to flush your nose and help eliminate what is causing your allergy response. Make sure to use bottled water.      4. Sudafed from pharmacist (not a prescription) -May use as long as no history of heart issues or high blood pressure.    5. Other measures such as the closing the windows in your home, vacuuming/dusting regularly and showering before bed during outdoor allergy season may also help reduce your exposure to allergens.      Without a fever or symptoms that are not getting worse, you may be better in the next 5-7 days.      If you start developing a fever greater then 100.4, symptoms greater then 14 days, or new and concerning symptoms, you should be seen in person.  Please make a follow up appointment with your primary care provider.  If at any point you believe you are experiencing a medical emergency the ER is advised.       Causes of Nasal Allergies  Nasal allergies are most often caused by 1 or more of these 4 kinds of allergens:   Pollen (which causes seasonal allergies)  Dust mites  Mold  Animals (including pests such as rodents)  Other substances, called irritants, can bother the nose and make allergy symptoms worse.     Pollen  Pollen comes from plants, including trees, grass, and weeds. Plants reproduce by moving tiny grains of pollen from plant to plant. Some pollen is carried by bees. And some is blown by the wind. It’s the wind-blown pollen that causes nasal  allergies. The amount of pollen in the air changes from season to season.   Dust mites   Dust mites are tiny bugs too small to see. They can live in mattresses, blankets, stuffed toys, and carpets. Their droppings are a common indoor cause of nasal allergies. Dust mites are normal. They're not an infestation and they don't bite.   Mold  Mold loves dark, damp areas, both indoors and outdoors. It tends to grow in bathrooms, basements, refrigerators, and in the soil of houseplants. Mold reproduces by sending tiny grains called spores into the air. If these spores are breathed in, they can cause a nasal allergic reaction. Mold is also present in decaying vegetable matter. For instance, in piles of wet leaves in a yard.   Animals  Pets are common causes of nasal allergies. This includes cats, dogs, birds, horses, and rabbits. Flakes of skin (dander), saliva left on fur when an animal cleans itself, urine in litter boxes and cages, and feathers can all cause nasal allergies. Pests, such as rodents, can also be a major cause of allergies. This is especially the case in urban settings. They may not even be in your own apartment.    Irritants  Irritants don’t cause nasal allergies. But they can make allergy symptoms worse. Examples of irritants include:   Cigarette smoke  Aerosol from e-cigarettes and other vaping devices  Perfume  Aerosol sprays  Smoke from wood stoves or fireplaces  Air pollution  Car exhaust  Strong odors such as from cleaning products    [employer] last reviewed this educational content on 3/1/2024  This information is for informational purposes only. This is not intended to be a substitute for professional medical advice, diagnosis, or treatment. Always seek the advice and follow the directions from your physician or other qualified health care provider.  © 7653-5308 [employer]. All rights reserved. This information is not intended as a substitute for professional medical care. Always  follow your healthcare professional's instructions.        Allergic Rhinitis  Allergic rhinitis is an allergic reaction that affects the nose, and often the eyes. It’s often known as nasal allergies. Nasal allergies are often due to things in the environment that are breathed in. Depending on what you're sensitive to, nasal allergies may occur only during certain seasons, or they may occur year round. Common indoor allergens include house dust mites, mold, cockroaches, and pet dander. Outdoor allergens include pollen from trees, grasses, and weeds.    Symptoms include a runny, stuffy, or itchy nose. They also include sneezing and red and itchy eyes. You may feel tired more often. Severe allergies may also affect your breathing and trigger a condition called asthma.    Tests can be done to see what allergens are affecting you. You may be referred to an allergy specialist for testing and further evaluation.   Home care  Your healthcare provider may prescribe medicines to help relieve allergy symptoms. These may include oral medicines, nasal sprays, or eye drops. Take these as directed by your provider. Don't share these prescription medicines with others.   Ask your provider for advice on how to stay away from substances that you're allergic to. Below are a few tips for each type of allergen.   Pet dander:  Don't have pets with fur and feathers.  If you have a pet, keep it out of your bedroom and off upholstered furniture.  Don't pet, hug, or kiss the pet. If you do touch the pet, wash your hands afterwards.  Pollen:  When pollen counts are high, keep windows of your car and home closed. If possible, use an air conditioner instead.  Wear a filter mask when mowing or doing yard work. Shower, wash your hair, and change clothes after being outside.  House dust mites:  Wash bedding every week in hot water and detergent and dry on a hot setting.  Cover the mattress, box spring, and pillows with allergy covers.   If  possible, sleep in a room with no carpet, curtains, or upholstered furniture.  Cockroaches:  Store food in sealed containers.  Keep cabinets and floors clean and free of food crumbs.  Remove garbage from the home promptly.  Fix water leaks.  Block all areas where cockroaches can enter the home. Or have a professional  remove the cockroaches.  Mold:  Keep humidity low by using a dehumidifier or air conditioner. Keep the dehumidifier and air conditioner clean and free of mold.  Clean moldy areas with bleach and water. Don't mix bleach with other .  In general:  Vacuum once or twice a week. If possible, use a vacuum with a high-efficiency particulate air (HEPA) filter.  Don't smoke. Stay away from cigarette smoke. And don't let people smoke in your home or car. Cigarette smoke is an irritant that can make symptoms worse.  Follow-up care  Follow up as advised by the healthcare provider or our staff. If you were referred to an allergy specialist, make this appointment promptly. Ask your provider if allergy testing or allergy immunotherapy (such as allergy shots) are right for you.   When to call your healthcare provider   Call your healthcare provider right away if these occur:   Coughing  Fever of 100.4°F (38°C) or higher, or as advised by your provider  Raised red bumps (hives)  Symptoms that are new, continue, or get worse  Call 911  Call 911 right away if you have:   Trouble breathing  Severe swelling of the face or severe itching of the eyes or mouth  Wheezing or shortness of breath  Chest tightness  Dizziness or lightheadedness  Feeling of doom  Stomach pain, bloating, vomiting, or diarrhea  [employer]un last reviewed this educational content on 6/1/2022  This information is for informational purposes only. This is not intended to be a substitute for professional medical advice, diagnosis, or treatment. Always seek the advice and follow the directions from your physician or other qualified health  care provider.  © 6282-5740 [employer]. All rights reserved. This information is not intended as a substitute for professional medical care. Always follow your healthcare professional's instructions.        Video [email or web address]  Step-by-Step: Using Nasal Spray  This video shows how to use a nasal spray.     To watch the video:  Scan the QR code  Using your mobile device, scan the following code:    OR  Go to the website:  www.ncyclo  Enter the prescription code:   [email or web address]   This information is for informational purposes only. This is not intended to be a substitute for professional medical advice, diagnosis, or treatment. Always seek the advice and follow the directions from your physician or other qualified health care provider.  © 5963-0920 [employer]. All rights reserved. This information is not intended as a substitute for professional medical care. Always follow your healthcare professional's instructions.

## 2025-05-29 ENCOUNTER — OFFICE VISIT (OUTPATIENT)
Dept: INTERNAL MEDICINE CLINIC | Facility: CLINIC | Age: 20
End: 2025-05-29
Payer: COMMERCIAL

## 2025-05-29 ENCOUNTER — HOSPITAL ENCOUNTER (OUTPATIENT)
Dept: GENERAL RADIOLOGY | Age: 20
Discharge: HOME | End: 2025-05-29
Attending: FAMILY MEDICINE
Payer: COMMERCIAL

## 2025-05-29 VITALS
OXYGEN SATURATION: 100 % | SYSTOLIC BLOOD PRESSURE: 120 MMHG | HEIGHT: 70 IN | TEMPERATURE: 98 F | HEART RATE: 91 BPM | DIASTOLIC BLOOD PRESSURE: 78 MMHG | BODY MASS INDEX: 22.62 KG/M2 | WEIGHT: 158 LBS

## 2025-05-29 DIAGNOSIS — R06.02 SHORTNESS OF BREATH: ICD-10-CM

## 2025-05-29 DIAGNOSIS — R00.2 PALPITATIONS: ICD-10-CM

## 2025-05-29 DIAGNOSIS — R53.83 OTHER FATIGUE: ICD-10-CM

## 2025-05-29 DIAGNOSIS — F41.9 ANXIETY: ICD-10-CM

## 2025-05-29 DIAGNOSIS — R06.02 SHORTNESS OF BREATH: Primary | ICD-10-CM

## 2025-05-29 DIAGNOSIS — Z91.09 ENVIRONMENTAL ALLERGIES: ICD-10-CM

## 2025-05-29 LAB
ATRIAL RATE: 79 BPM
P AXIS: 77 DEGREES
P-R INTERVAL: 150 MS
Q-T INTERVAL: 376 MS
QRS DURATION: 102 MS
QTC CALCULATION (BEZET): 431 MS
R AXIS: 56 DEGREES
T AXIS: 46 DEGREES
VENTRICULAR RATE: 79 BPM

## 2025-05-29 PROCEDURE — 71046 X-RAY EXAM CHEST 2 VIEWS: CPT | Performed by: FAMILY MEDICINE

## 2025-05-29 PROCEDURE — 3074F SYST BP LT 130 MM HG: CPT | Performed by: FAMILY MEDICINE

## 2025-05-29 PROCEDURE — 99213 OFFICE O/P EST LOW 20 MIN: CPT | Performed by: FAMILY MEDICINE

## 2025-05-29 PROCEDURE — 93000 ELECTROCARDIOGRAM COMPLETE: CPT | Performed by: FAMILY MEDICINE

## 2025-05-29 PROCEDURE — 3078F DIAST BP <80 MM HG: CPT | Performed by: FAMILY MEDICINE

## 2025-05-29 PROCEDURE — 3008F BODY MASS INDEX DOCD: CPT | Performed by: FAMILY MEDICINE

## 2025-05-29 NOTE — ASSESSMENT & PLAN NOTE
Patient reports mild fatigue.  Monitor stress levels.  Cut back on nicotine usage as well as marijuana usage.  Reports lately has been improving  Follow-up as needed.

## 2025-05-29 NOTE — PROGRESS NOTES
FAMILY MEDICINE CLINIC NOTE    HPI  Abner Cherry is a 20 year old male presenting for     #SOB  -reports occasional rib pain and chest pain  -started last week  -reports no recent palpations  -no recent trauma or injury  -notes some anxiety currently - nature, the future  -does feel like he is having a hard time breathing currently  -denies depression  -reports shortness of breath when standing still  -no shortness of breath of with movement  -yesterday when driving fingers slightly numbess  -history of anxiety attacks in 8th grade     #Environmental allergies  -occasional allergies  -history of normal PFTs in past  -no issues with breathing at this time  -using levocetirizine - helpful      #Fatigue  -reports history of fatigue 2-3 months, possibly longer  -wakes up tired  -no known history of snoring  -no depression  -no issues with sleeping  5/2025  -reports improving    #Palpitations  -only with smoking marijuana  -denies any significant anxiety  -occurs for a couple of minutes  -experiences this every other day  -feels lightheaded during these times   5/2025  -reports cutting back on marijuana  -denies any palpitations lately        ROS  GENERAL: No fever/chills, no recent weight loss  HEENT: No visual changes, no changes in hearing, no sore throats  NECK: No pain, no swelling  RESP: No cough, +SOB  CV: No chest pain, no palpitations  GI: No abd pain, no N/V/D  MSK: No edema  SKIN: No new rashes  NEURO: No numbness, no tingling, no headaches    HEALTH MAINTENANCE  Health Maintenance Topics with due status: Overdue       Topic Date Due    HPV Vaccines Never done    Meningococcal B Vaccine Never done    COVID-19 Vaccine Never done       ALLERGIES  Allergies[1]    MEDICATIONS  Current Medications[2]    ACTIVE PROBLEMS  Problem List[3]    PAST MEDICAL HISTORY  Past Medical History[4]    PAST SOCIAL HISTORY  Social History     Socioeconomic History    Marital status: Single     Spouse name: Not on file     Number of children: Not on file    Years of education: Not on file    Highest education level: Not on file   Occupational History    Not on file   Tobacco Use    Smoking status: Former     Current packs/day: 0.00     Average packs/day: 0.1 packs/day for 1 year (0.1 ttl pk-yrs)     Types: Cigarettes     Start date:      Quit date:      Years since quittin.4    Smokeless tobacco: Current    Tobacco comments:     Nicotine pouches   Vaping Use    Vaping status: Former    Quit date: 2023    Substances: Nicotine, THC, Flavoring   Substance and Sexual Activity    Alcohol use: Yes     Comment: Occasional - once a month    Drug use: Yes     Types: Cannabis    Sexual activity: Not Currently     Partners: Female   Other Topics Concern    Not on file   Social History Narrative    Relationships: Single    Children: None    Pets: Dog and Cat and Fish    School: [employer] - associates in general studies. 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     Interests: Enjoys music, DJing    Spiritual: Yazidi background     Social Drivers of Health     Food Insecurity: Not on file   Transportation Needs: Not on file   Stress: Not on file   Housing Stability: At Risk (2023)    Received from [employer]    [facility]     Living Situation: Not on file     Housing Problems: Not on file       PAST SURGICAL HISTORY  Past Surgical History[5]    PAST FAMILY HISTORY  Family History[6]      PHYSICAL EXAM  Vitals:    25 1141   BP: 120/78   Pulse: 91   Temp: 98.4 °F (36.9 °C)   SpO2: 100%   Weight: 158 lb (71.7 kg)   Height: 5' 10\" (1.778 m)      Body mass index is 22.67 kg/m².    GENERAL: NAD  RESP: Non-labored respirations, CTAB, no wheezing, no rales, no rhonchi  CV: RRR, no murmurs  MSK: No edema  SKIN: Warm and dry, no rashes  NEURO: Answering questions appropriately    LABS  Lab Results   Component Value Date    WBC 3.4 (L) 2025    HGB 14.5 2025     HCT 42.9 03/20/2025    .0 03/20/2025    NEPERCENT 45.1 03/20/2025    LYPERCENT 37.0 03/20/2025    MOPERCENT 11.3 03/20/2025    EOPERCENT 5.7 03/20/2025    BAPERCENT 0.9 03/20/2025    NE 1.51 03/20/2025    LYMABS 1.24 03/20/2025    MOABSO 0.38 03/20/2025    EOABSO 0.19 03/20/2025    BAABSO 0.03 03/20/2025       Lab Results   Component Value Date     03/20/2025    K 4.7 03/20/2025     03/20/2025    CO2 29.0 03/20/2025    ANIONGAP 5 03/20/2025    BUN 15 03/20/2025    CREATSERUM 1.04 03/20/2025    BUNCREA 14.4 03/20/2025    GLU 87 03/20/2025    CA 9.5 03/20/2025    OSMOCALC 290 03/20/2025    ALT 12 03/20/2025    AST 17 03/20/2025    ALKPHO 75 03/20/2025    BILT 0.8 03/20/2025    TP 7.3 03/20/2025    ALB 5.0 (H) 03/20/2025    GLOBULIN 2.3 03/20/2025    ELECTAG 2.2 (H) 03/20/2025    FASTING Yes 03/20/2025    FASTING Yes 03/20/2025         Lab Results   Component Value Date    CHOLEST 166 03/20/2025    TRIG 35 03/20/2025    HDL 58 03/20/2025     (H) 03/20/2025    VLDL 6 03/20/2025    NONHDLC 108 03/20/2025        DIAGNOSTICS      ASSESSMENT/PLAN  Problem List Items Addressed This Visit          Cardiac and Vasculature    Palpitations    Patient with infrequent intermittent palpitations, exclusively occurring when he is smoking marijuana.  He states he has cut back on marijuana since the last visit and he is feeling better  Labs were unremarkable.   EKG is still normal in the office  If with persistent palpitations, can consider holter monitor.  ER precautions discussed.  Follow-up as needed            Pulmonary and Pneumonias    Shortness of breath - Primary    Patient with intermittent dyspnea, sometimes with left-sided chest discomfort  He is nontoxic-appearing  Vitals are all normal.    Nonlabored respirations.  EKG remains normal in the office today.  I will check a chest x-ray.  He does acknowledge that he has some anxiety  He has a history of panic attacks in eighth grade as well.  I  suspect that this is most likely related to anxiety.  His allergies are well-managed at this time.  His examination is otherwise normal.  Discussed options for management of anxiety.  He is interested in therapy.  Steve goode order for referrals to therapist  I discussed with him that if he is interested in starting medication, can follow-up with me as needed.  Otherwise I will plan to see him in 6 months         Relevant Orders    EKG In-Office [[id number]] (Completed)    XR CHEST PA + LAT CHEST (UNR=[id number])       Mental Health    Anxiety    Patient with intermittent dyspnea, sometimes with left-sided chest discomfort  He is nontoxic-appearing  Vitals are all normal.    Nonlabored respirations.  EKG remains normal in the office today.  I will check a chest x-ray.  He does acknowledge that he has some anxiety  He has a history of panic attacks in eighth grade as well.  I suspect that this is most likely related to anxiety.  His allergies are well-managed at this time.  His examination is otherwise normal.  Discussed options for management of anxiety.  He is interested in therapy.  Steve goode order for referrals to therapist  I discussed with him that if he is interested in starting medication, can follow-up with me as needed.  Otherwise I will plan to see him in 6 months         Relevant Orders     NAVIGDEYSI       Allergies and Adverse Reactions    Environmental allergies    Patient with environmental allergies.  Has had normal pulmonary function testing in the past.  Can use levocetirizine as needed.  Reports overall well managed            Symptoms and Signs    Fatigue    Patient reports mild fatigue.  Monitor stress levels.  Cut back on nicotine usage as well as marijuana usage.  Reports lately has been improving  Follow-up as needed.            Return in about 6 months (around 11/29/2025) for follow up.    This is a Header Test   Topic Date Due    Annual Physical  03/18/2026        Julio  MD Yovanny  Family Medicine           Pre-chartin minutes  Reviewing/obtaining: 10 minutes  Medical Exam:1 minutes  Counseling/education: 5 minutes  Notes: 5 minutes  Referring/communicatin minutes  Care coordination: 0 minutes    My total time spent caring for the patient on the day of the encounter: 23 minutes         [1]   Allergies  Allergen Reactions    Omnicef [Cefdinir] RASH   [2]   Current Outpatient Medications   Medication Sig Dispense Refill    levocetirizine 5 MG Oral Tab Take 1 tablet (5 mg total) by mouth every evening. 30 tablet 0    fluticasone propionate (FLONASE ALLERGY RELIEF) 50 MCG/ACT Nasal Suspension 2 sprays by Each Nare route daily. 1 each 0    Olopatadine HCl (PATADAY) 0.2 % Ophthalmic Solution Apply 1 drop to eye daily. (Patient not taking: Reported on 2025) 5 mL 0   [3]   Patient Active Problem List  Diagnosis    Environmental allergies    Fatigue    Palpitations    Health maintenance examination    Anxiety    Shortness of breath   [4]   Past Medical History:   Environmental allergies   [5] No past surgical history on file.  [6]   Family History  Problem Relation Age of Onset    Other (Ankylosing spondylitis) Mother     Obesity Father     No Known Problems Brother     Heart Disease Maternal Grandmother         heart disease vs lung disease    Arthritis Maternal Grandfather     No Known Problems Paternal Grandmother     Lung Disorder Paternal Grandfather     Diabetes Maternal Great-Grandmother     Colon Cancer Neg     Prostate Cancer Neg

## 2025-05-29 NOTE — ASSESSMENT & PLAN NOTE
Patient with intermittent dyspnea, sometimes with left-sided chest discomfort  He is nontoxic-appearing  Vitals are all normal.    Nonlabored respirations.  EKG remains normal in the office today.  I will check a chest x-ray.  He does acknowledge that he has some anxiety  He has a history of panic attacks in eighth grade as well.  I suspect that this is most likely related to anxiety.  His allergies are well-managed at this time.  His examination is otherwise normal.  Discussed options for management of anxiety.  He is interested in therapy.  Steve Bingham navigator order for referrals to therapist  I discussed with him that if he is interested in starting medication, can follow-up with me as needed.  Otherwise I will plan to see him in 6 months

## 2025-05-29 NOTE — ASSESSMENT & PLAN NOTE
Patient with infrequent intermittent palpitations, exclusively occurring when he is smoking marijuana.  He states he has cut back on marijuana since the last visit and he is feeling better  Labs were unremarkable.   EKG is still normal in the office  If with persistent palpitations, can consider holter monitor.  ER precautions discussed.  Follow-up as needed

## 2025-05-29 NOTE — ASSESSMENT & PLAN NOTE
Patient with environmental allergies.  Has had normal pulmonary function testing in the past.  Can use levocetirizine as needed.  Reports overall well managed

## 2025-05-29 NOTE — PATIENT INSTRUCTIONS
PATIENT INSTRUCTIONS    Thank you for seeing me today, it was a pleasure taking care of you.  Please check out at the  and schedule a follow up appointment.  Return in about 6 months (around 11/29/2025) for follow up.  Please remember that the preferred deborah period for appointments is 5 minutes. This is to help maximize the amount of time that we can spend together at our visits.    Please monitor for a call from [facility]. Someone will reach out to you to speak with you.  If you do not hear a response from [facility] in 1 week, please call them at [phone].   If anxiety remains persistent, can see me sooner as needed  Chest x-ray    Best,  Dr. Hairston        [facility] AND [facility]AG[facility] INFORMATION    Here are some lab and imaging locations available to you. Please note that some of the times and availabilities are subject to change. Please refer to the [facility] webpage for the most recent updates. There are also additional locations that can be found on the [facility] webpage.    To schedule a lab appointment, please call [phone].    To schedule an imaging appointment, please call [phone], option 2      [facility] - [address] [address]    Lab Hours  Monday - Friday 7:30am - 5pm  Saturday 6:30am - 3pm    X-ray Hours  Call [phone] for hours or to schedule      [facility]  [address] [place] [address]    Lab Hours  Monday - Friday 6:30am - 8pm  Saturday 6:30am - 3pm  Sunday 6:30am - 3pm    X-ray Hours  Call [phone] for hours or to schedule      [facility]  [address] [place] [address]    Lab Hours  Monday - Friday 7:30am - 5pm  Saturday 7:30am - 11:30am    X-ray Hours  None at this location      [facility] - [address]  [place] [address]    Lab Hours  Monday - Friday 8am - 12pm    X-ray Hours  Call [phone] for hours or to schedule      [facility] [place] [address] [place] [address]    Lab Hours  Monday - Friday 7:30am - 5pm  Saturday 6:30am - 3pm    X-ray Hours  Call [phone] for hours or to schedule      [facility] [place]  [address]  [place] [address]    Lab Hours  Monday - Friday 7:30am - 4pm (closed 12:15pm - 1pm)    X-ray Hours  Call [phone] for hours or to schedule      [facility] [place]  [address] [facility] [address]  [place] [address]    Lab Hours  Monday - Friday 8am - 4:30pm (closed 12:15pm - 1pm)    X-ray Hours  None at this location

## 2025-06-03 ENCOUNTER — TELEPHONE (OUTPATIENT)
Age: 20
End: 2025-06-03

## 2025-06-03 NOTE — TELEPHONE ENCOUNTER
Hello,     The [facility] Navigation team has attempted to reach you regarding an order from Dr. Hairston's office. We are reaching out in order to assist you in coordinating care and resources that may meet your needs. Please give our office a call at [phone]. For more immediate assistance you can contact our 24-hour help line at [phone]. We look forward to hearing from you soon.

## 2025-06-18 ENCOUNTER — OFFICE VISIT (OUTPATIENT)
Dept: DERMATOLOGY CLINIC | Facility: CLINIC | Age: 20
End: 2025-06-18

## 2025-06-18 DIAGNOSIS — D22.9 MULTIPLE BENIGN NEVI: ICD-10-CM

## 2025-06-18 DIAGNOSIS — L81.4 LENTIGINES: Primary | ICD-10-CM

## 2025-06-18 PROCEDURE — 99204 OFFICE O/P NEW MOD 45 MIN: CPT | Performed by: STUDENT IN AN ORGANIZED HEALTH CARE EDUCATION/TRAINING PROGRAM

## 2025-06-18 NOTE — PROGRESS NOTES
New patient     Referred by:   Julio Hairston MD    CHIEF COMPLAINT: [id number]    HISTORY OF PRESENT ILLNESS: .      - No particular lesions of concern.       DERM HISTORY:  History of skin cancer: No  History of melanoma: No    FAMILY HISTORY:  History of melanoma: No    PAST MEDICAL HISTORY:  Past Medical History[1]    REVIEW OF SYSTEMS:  Constitutional: Denies fever, chills, unintentional weight loss.   Skin as per HPI    Medications  Current Medications[2]    PHYSICAL EXAM:  Patient declined chaperone   General: awake, alert, no acute distress  Skin: Skin exam was performed today including the following: head and face, scalp, neck, chest (including breasts and axillae), abdomen, back, bilateral upper extremities, bilateral lower extremities, hands, feet, digits, nails. Pertinent findings include:   - Scattered light brown stellate macules on sun exposed sites  - Scattered, evenly colored, round brown macules and papules with regular borders on the trunk and extremities        ASSESSMENT & PLAN:  Pathophysiology of diagnoses discussed with patient.  Therapeutic options reviewed. Risks, benefits, and alternatives discussed with patient. Instructions reviewed at length.    #Lentigines  - Discussed benign appearance and provided reassurance. No treatment but observation at this time. Follow-up for concerning physical changes or new symptoms.  - Recommend sun protection with spf 30 or higher, sun protective clothing such as wide brimmed hats and long sleeves. Recommend avoiding midday sun (10 am- 3 pm).       #Multiple benign nevi  - Complete skin exam performed today with no outlier lesions identified   - Reassured patient of benign nature of these lesions.   - Recommend daily photoprotection with broad-spectrum sunscreen, avoidance of sun during peak hours, and sun protective clothing.    - Dermoscopy was used for physical examination of pigmented lesions during today's office visit.      Return to clinic: 2-3 years   or sooner if something concerning arises     Hany López MD    By signing my name below, I, Zaida HINDS MA,  attest that this documentation has been prepared under the direction and in the presence of Hany López MD.   Electronically Signed: Zaida HINDS MA, 6/18/2025, 8:33 AM.    I, Hany López MD,  personally performed the services described in this documentation. All medical record entries made by the scribe were at my direction and in my presence.  I have reviewed the chart and agree that the record reflects my personal performance and is accurate and complete.  Hany López MD, 6/18/2025, 8:56 AM           [1]   Past Medical History:   Environmental allergies   [2]   Current Outpatient Medications   Medication Sig Dispense Refill    levocetirizine 5 MG Oral Tab Take 1 tablet (5 mg total) by mouth every evening. 30 tablet 0    fluticasone propionate (FLONASE ALLERGY RELIEF) 50 MCG/ACT Nasal Suspension 2 sprays by Each Nare route daily. 1 each 0    Olopatadine HCl (PATADAY) 0.2 % Ophthalmic Solution Apply 1 drop to eye daily. (Patient not taking: Reported on 5/29/2025) 5 mL 0

## 2025-06-25 ENCOUNTER — TELEPHONE (OUTPATIENT)
Age: 20
End: 2025-06-25